# Patient Record
Sex: FEMALE | Race: WHITE | Employment: FULL TIME | URBAN - METROPOLITAN AREA
[De-identification: names, ages, dates, MRNs, and addresses within clinical notes are randomized per-mention and may not be internally consistent; named-entity substitution may affect disease eponyms.]

---

## 2017-08-10 ENCOUNTER — OFFICE VISIT (OUTPATIENT)
Dept: INTERNAL MEDICINE CLINIC | Age: 27
End: 2017-08-10

## 2017-08-10 VITALS
DIASTOLIC BLOOD PRESSURE: 62 MMHG | HEIGHT: 69 IN | BODY MASS INDEX: 19.55 KG/M2 | TEMPERATURE: 97.4 F | HEART RATE: 74 BPM | SYSTOLIC BLOOD PRESSURE: 99 MMHG | WEIGHT: 132 LBS | OXYGEN SATURATION: 100 %

## 2017-08-10 DIAGNOSIS — Z83.49 FAMILY HISTORY OF THYROID DISEASE: ICD-10-CM

## 2017-08-10 DIAGNOSIS — E04.1 THYROID NODULE: Primary | ICD-10-CM

## 2017-08-10 LAB
A-G RATIO,AGRAT: 1.7 RATIO (ref 1.1–2.6)
ALBUMIN SERPL-MCNC: 4.8 G/DL (ref 3.5–5)
ALP SERPL-CCNC: 67 U/L (ref 25–115)
ALT SERPL-CCNC: 8 U/L (ref 5–40)
ANION GAP SERPL CALC-SCNC: 26 MMOL/L
AST SERPL W P-5'-P-CCNC: 23 U/L (ref 10–37)
BILIRUB SERPL-MCNC: 0.3 MG/DL (ref 0.2–1.2)
BUN SERPL-MCNC: 11 MG/DL (ref 6–22)
CALCIUM SERPL-MCNC: 9.7 MG/DL (ref 8.4–10.5)
CHLORIDE SERPL-SCNC: 99 MMOL/L (ref 98–110)
CO2 SERPL-SCNC: 22 MMOL/L (ref 20–32)
CREAT SERPL-MCNC: 0.6 MG/DL (ref 0.5–1.2)
ERYTHROCYTE [DISTWIDTH] IN BLOOD BY AUTOMATED COUNT: 13 % (ref 10–16)
GFRAA, 66117: >60
GFRNA, 66118: >60
GLOBULIN,GLOB: 2.8 G/DL (ref 2–4)
GLUCOSE SERPL-MCNC: 89 MG/DL (ref 65–99)
HCT VFR BLD AUTO: 40 % (ref 35.1–46.5)
HGB BLD-MCNC: 12.5 G/DL (ref 11.7–15.5)
MCH RBC QN AUTO: 28 PG (ref 26–34)
MCHC RBC AUTO-ENTMCNC: 31 G/DL (ref 32–36)
MCV RBC AUTO: 91 FL (ref 80–95)
PLATELET # BLD AUTO: 230 K/UL (ref 140–440)
PMV BLD AUTO: 11.2 FL (ref 6–10.8)
POTASSIUM SERPL-SCNC: 4 MMOL/L (ref 3.5–5.5)
PROT SERPL-MCNC: 7.6 G/DL (ref 6.4–8.3)
RBC # BLD AUTO: 4.4 M/UL (ref 3.8–5.2)
SODIUM SERPL-SCNC: 147 MMOL/L (ref 133–145)
T4 FREE SERPL-MCNC: 1.1 NG/DL (ref 0.9–1.8)
TSH SERPL DL<=0.005 MIU/L-ACNC: 3.56 MCU/ML (ref 0.27–4.2)
WBC # BLD AUTO: 4.5 K/UL (ref 4–11)

## 2017-08-10 RX ORDER — ASCORBIC ACID 250 MG
1000 TABLET ORAL DAILY
COMMUNITY
End: 2022-09-22

## 2017-08-10 NOTE — PROGRESS NOTES
HISTORY OF PRESENT ILLNESS  Sandra Puente is a 24 y.o. female. HPI Comments: New patient presents today with c/o mass on thyroid, onset 2 weeks ago, unchanged since onset. She denies pain, immobility or firmness of nodule. When asked about family history she reports Hypothyroidism on mothers side (mom, MGM, aunt). States she had her thyroid tested 2-3yrs ago d/t weight gain- results were normal.   She denies acid reflux, dysphagia, sore throat, new onset fatigue, weight gain, constipation, memory problems, hair loss, new medication. She does report recent stress, acne flare up and change in diet to all vegan. Mass   The history is provided by the patient. This is a new problem. The current episode started more than 1 week ago. The problem occurs daily. The problem has not changed since onset. Pertinent negatives include no chest pain and no shortness of breath. Nothing aggravates the symptoms. Nothing relieves the symptoms. She has tried nothing for the symptoms. Review of Systems   Constitutional: Negative for chills, fever and malaise/fatigue. HENT: Negative for sore throat. Respiratory: Negative for shortness of breath. Cardiovascular: Negative for chest pain. Gastrointestinal: Negative for constipation, diarrhea and heartburn. Musculoskeletal: Negative for neck pain. Skin: Negative for rash. Endo/Heme/Allergies: Negative for environmental allergies. Psychiatric/Behavioral:        Stress       Physical Exam   Constitutional: She appears well-developed and well-nourished. No distress. HENT:   Head: Normocephalic and atraumatic. Right Ear: External ear normal.   Left Ear: External ear normal.   Nose: Nose normal.   Mouth/Throat: Oropharynx is clear and moist.   Scattered acne vulgaris on bilateral jawline   Neck: Normal range of motion. Carotid bruit is not present. No edema and no erythema present. Thyroid mass present. No thyromegaly present.        Cardiovascular: Normal rate and regular rhythm. Pulmonary/Chest: Effort normal and breath sounds normal.   Lymphadenopathy:     She has no cervical adenopathy. Visit Vitals    BP 99/62 (BP 1 Location: Right arm, BP Patient Position: Sitting)    Pulse 74    Temp 97.4 °F (36.3 °C) (Oral)    Ht 5' 9\" (1.753 m)    Wt 132 lb (59.9 kg)    SpO2 100%    BMI 19.49 kg/m2      ASSESSMENT and PLAN    ICD-10-CM ICD-9-CM    1. Thyroid nodule E04.1 241.0 TSH AND FREE T4      METABOLIC PANEL, COMPREHENSIVE      CBC W/O DIFF      US THYROID/PARATHYROID/SOFT TISS   2. Family history of thyroid disease Z83.49 V18.19 US THYROID/PARATHYROID/SOFT TISS     Reviewed plan with patient including diagnoses, treatment and follow up. Provided AVS with education on above diagnoses. No further questions/concerns at this time. Pt to follow up as scheduled or sooner if symptoms worsen/fail to improve.

## 2017-08-10 NOTE — MR AVS SNAPSHOT
Visit Information Date & Time Provider Department Dept. Phone Encounter #  
 8/10/2017  9:00 AM Renny BolandRASHIDA [facility] [phone] [id number] Follow-up Instructions Return if symptoms worsen or fail to improve. Upcoming Health Maintenance Date Due  
 HPV AGE 9Y-34Y (1 of 3 - Female 3 Dose Series) 1/21/2003 DTaP/Tdap/Td series (1 - Tdap) 1/21/2013 PAP AKA CERVICAL CYTOLOGY 1/21/2013 INFLUENZA AGE 9 TO ADULT 8/1/2017 Allergies as of 8/10/2017  Never Reviewed Severity Noted Reaction Type Reactions Sulfa (Sulfonamide Antibiotics)  08/10/2017    Hives, Rash Current Immunizations  Never Reviewed No immunizations on file. Not reviewed this visit You Were Diagnosed With   
  
 Codes Comments Thyroid nodule    -  Primary ICD-10-CM: E04.1 ICD-9-CM: 241.0 Family history of thyroid disease     ICD-10-CM: Z83.49 
ICD-9-CM: V18.19 Vitals BP Pulse Temp Height(growth percentile) Weight(growth percentile) SpO2  
 99/62 (BP 1 Location: Right arm, BP Patient Position: Sitting) 74 97.4 °F (36.3 °C) (Oral) 5' 9\" (1.753 m) 132 lb (59.9 kg) 100% BMI Smoking Status 19.49 kg/m2 Never Smoker BMI and BSA Data Body Mass Index Body Surface Area  
 19.49 kg/m 2 1.71 m 2 Preferred Pharmacy Pharmacy Name Phone [facility] [address] [phone] Your Updated Medication List  
  
   
This list is accurate as of: 8/10/17  9:34 AM.  Always use your most recent med list.  
  
  
  
  
 multivitamin, tx-iron-ca-min 9 mg iron-400 mcg Tab tablet Commonly known as:  THERA-M w/ IRON Take 1 Tab by mouth daily. VITAMIN C 250 mg tablet Generic drug:  ascorbic acid (vitamin C) Take  by mouth. Follow-up Instructions Return if symptoms worsen or fail to improve. To-Do List   
 08/10/2017 Lab:  CBC W/O DIFF   
  
 08/10/2017 Lab:  METABOLIC PANEL, COMPREHENSIVE   
  
 08/10/2017 Lab:  TSH AND FREE T4   
  
 08/11/2017 Imaging:  US THYROID/PARATHYROID/SOFT TISS Introducing [facility]! [facility] introduces [employer] patient portal. Now you can access parts of your medical record, email your doctor's office, and request medication refills online. 1. In your internet browser, go to [email or web address] 2. Click on the First Time User? Click Here link in the Sign In box. You will see the New Member Sign Up page. 3. Enter your [employer] Access Code exactly as it appears below. You will not need to use this code after youve completed the sign-up process. If you do not sign up before the expiration date, you must request a new code. · [employer] Access Code: [email or web address] Expires: 11/8/2017  9:34 AM 
 
4. Enter the last four digits of your Social Security Number (xxxx) and Date of Birth (mm/dd/yyyy) as indicated and click Submit. You will be taken to the next sign-up page. 5. Create a [employer] ID. This will be your [employer] login ID and cannot be changed, so think of one that is secure and easy to remember. 6. Create a [employer] password. You can change your password at any time. 7. Enter your Password Reset Question and Answer. This can be used at a later time if you forget your password. 8. Enter your e-mail address. You will receive e-mail notification when new information is available in [address] 9. Click Sign Up. You can now view and download portions of your medical record. 10. Click the Download Summary menu link to download a portable copy of your medical information. If you have questions, please visit the Frequently Asked Questions section of the [employer] website. Remember, [employer] is NOT to be used for urgent needs. For medical emergencies, dial 911. Now available from your iPhone and Android! Please provide this summary of care documentation to your next provider. Your primary care clinician is listed as Eloisa Solano. If you have any questions after today's visit, please call [phone].

## 2017-08-10 NOTE — PROGRESS NOTES
ROOM # 9     Mahogany Abbasi presents today for lump in throat x 2 week     Mahogany Abbasi preferred language for health care discussion is english/other. Is someone accompanying this pt? no    Is the patient using any DME equipment during OV? no    Depression Screening:  PHQ over the last two weeks 8/10/2017   Little interest or pleasure in doing things Not at all   Feeling down, depressed or hopeless Not at all   Total Score PHQ 2 0       Learning Assessment:  Learning Assessment 8/10/2017   PRIMARY LEARNER Patient   HIGHEST LEVEL OF EDUCATION - PRIMARY LEARNER  4 YEARS OF COLLEGE   BARRIERS PRIMARY LEARNER NONE   CO-LEARNER CAREGIVER No   PRIMARY LANGUAGE ENGLISH   LEARNER PREFERENCE PRIMARY READING   ANSWERED BY Patient    RELATIONSHIP SELF       Abuse Screening:  Abuse Screening Questionnaire 8/10/2017   Do you ever feel afraid of your partner? N   Are you in a relationship with someone who physically or mentally threatens you? N   Is it safe for you to go home? Y       Fall Risk  n/i    Health Maintenance reviewed and discussed per provider. Yes    Mahogany Abbasi is due for   INFLUENZA AGE 9 TO ADULT 8/1/2017     . Please order/place referral if appropriate. Advance Directive:  1. Do you have an advance directive in place? Patient Reply: no    2. If not, would you like material regarding how to put one in place? Patient Reply: no    Coordination of Care:  1. Have you been to the ER, urgent care clinic since your last visit? Hospitalized since your last visit? no    2. Have you seen or consulted any other health care providers outside of the [address] [place] [address] since your last visit? Include any pap smears or colon screening.  no

## 2017-08-11 NOTE — PROGRESS NOTES
Please let patient know:  1. Labs came back normal  2.  We will still plan for ultrasound of thyroid

## 2017-08-12 ENCOUNTER — TELEPHONE (OUTPATIENT)
Dept: INTERNAL MEDICINE CLINIC | Age: 27
End: 2017-08-12

## 2017-08-12 NOTE — TELEPHONE ENCOUNTER
----- Message from Jacy Rodriguez NP sent at 8/11/2017  7:28 AM EDT -----  Please let patient know:  1. Labs came back normal  2.  We will still plan for ultrasound of thyroid

## 2017-08-16 ENCOUNTER — HOSPITAL ENCOUNTER (OUTPATIENT)
Dept: ULTRASOUND IMAGING | Age: 27
Discharge: HOME | End: 2017-08-16
Attending: NURSE PRACTITIONER
Payer: COMMERCIAL

## 2017-08-16 DIAGNOSIS — Z83.49 FAMILY HISTORY OF THYROID DISEASE: ICD-10-CM

## 2017-08-16 DIAGNOSIS — E04.1 THYROID NODULE: ICD-10-CM

## 2017-08-16 PROCEDURE — 76536 US EXAM OF HEAD AND NECK: CPT

## 2017-08-16 NOTE — LETTER
8/18/2017 12:54 PM 
 
Ms. Mannie Newell [address] [place] [address] [phone] Dear Mannie Newell: 
 
Please find your most recent results below. Resulted Orders US THYROID/PARATHYROID/SOFT TISS Narrative ULTRASOUND THYROID History: Palpable nodule or enlarged thyroid Technique: Contact transcervical thyroid ultrasound performed. No prior exams. Findings: 
 
Right: 
[ Right thyroid lobe is of normal size and parenchymal echogenicity, measuring 5.0 x 1.5 x 1.6 cm. No evident discrete lobar nodule ] Isthmus appears unremarkable. Left: 
[ Left thyroid lobe is of normal size and parenchymal echogenicity, measuring 4.5 x 1.4 x 1.5 cm. There is a diminutive 0.2 cm hypoechoic nodule or cyst. ] Miscellaneous: 
[ Just superolateral to the right thyroid lobe, there is an elliptical 1.3 x 1.0 
x 0.4 cm hypoechoic homogeneous nodule which has mild internal vasculature which 
is somewhat superficial to the strap musculature, off midline. ] Impression Impression: 1. [Sonographically unremarkable thyroid gland. 2. Elliptical hypodense nodule superficial right supra thyroid neck nodule. Features are nonspecific but this may represent a mildly enlarged lymph node]. RECOMMENDATIONS: 
Node is superficial (below the surface) and not on the thyroid gland, likely representing a swollen lymph node. We will monitor for the next month and if the node does not decrease in size or if it increases, then we will proceed with a biopsy. Please call me if you have any questions: [phone] Sincerely, Reina Swain NP

## 2017-08-17 NOTE — PROGRESS NOTES
Please let patient know results thyroid US:  1. Nodule is likely a lymph node (can be r/t acute viral infection)   2. We will monitor nodule for now  3.  If changes in size, firmness, or presence of pain, let us know

## 2017-08-18 ENCOUNTER — TELEPHONE (OUTPATIENT)
Dept: INTERNAL MEDICINE CLINIC | Age: 27
End: 2017-08-18

## 2017-08-18 NOTE — TELEPHONE ENCOUNTER
----- Message from Kelvin Carey NP sent at 8/17/2017 11:02 AM EDT -----  Please let patient know results thyroid US:  1. Nodule is likely a lymph node (can be r/t acute viral infection)   2. We will monitor nodule for now  3.  If changes in size, firmness, or presence of pain, let us know

## 2017-08-18 NOTE — TELEPHONE ENCOUNTER
2 pt. Identifiers confirmed. Pt. Notified of below. Explained to pt. That this could have been something simple like a URI/cold that caused this. Pt. staets she would like to s/w RASHIDA Juares as she doesn not remember being sick. Pt. Transferred to s/w RASHIDA Juaers. No other questions/concerns at this time.

## 2017-08-18 NOTE — TELEPHONE ENCOUNTER
----- Message from Faustina Garcia NP sent at 8/17/2017 11:02 AM EDT -----  Please let patient know results thyroid US:  1. Nodule is likely a lymph node (can be r/t acute viral infection)   2. We will monitor nodule for now  3.  If changes in size, firmness, or presence of pain, let us know

## 2017-08-18 NOTE — PROGRESS NOTES
Call made to pt, pt was advised that:    1. Nodule is likely a lymph node (can be r/t acute viral infection)   2. We will monitor nodule for now   3. If changes in size, firmness, or presence of pain, let us know     Pt states that she doesn't understand how this could be r/t acute viral infection because she wasn't feeling sick or experiencing any type of illness. I advised pt that I would forward her concerns to the provider. Pt verbalized understanding.

## 2017-08-22 ENCOUNTER — OFFICE VISIT (OUTPATIENT)
Dept: INTERNAL MEDICINE CLINIC | Age: 27
End: 2017-08-22

## 2017-08-22 VITALS
RESPIRATION RATE: 18 BRPM | TEMPERATURE: 97.4 F | WEIGHT: 135.6 LBS | DIASTOLIC BLOOD PRESSURE: 66 MMHG | HEIGHT: 69 IN | OXYGEN SATURATION: 99 % | HEART RATE: 103 BPM | BODY MASS INDEX: 20.08 KG/M2 | SYSTOLIC BLOOD PRESSURE: 101 MMHG

## 2017-08-22 DIAGNOSIS — R59.0 ENLARGED LYMPH NODE IN NECK: ICD-10-CM

## 2017-08-22 DIAGNOSIS — L73.9 FOLLICULITIS: Primary | ICD-10-CM

## 2017-08-22 NOTE — MR AVS SNAPSHOT
Visit Information Date & Time Provider Department Dept. Phone Encounter #  
 8/22/2017  3:00 PM Joseph Thayer NP [facility] [phone] [id number] Follow-up Instructions Return if symptoms worsen or fail to improve. Upcoming Health Maintenance Date Due  
 HPV AGE 9Y-34Y (1 of 3 - Female 3 Dose Series) 1/21/2003 PAP AKA CERVICAL CYTOLOGY 1/21/2013 DTaP/Tdap/Td series (1 - Tdap) 9/15/2017* INFLUENZA AGE 9 TO ADULT 9/15/2017* *Topic was postponed. The date shown is not the original due date. Allergies as of 8/22/2017  Review Complete On: 8/22/2017 By: Kathi Rosales LPN Severity Noted Reaction Type Reactions Sulfa (Sulfonamide Antibiotics)  08/10/2017    Hives, Rash Current Immunizations  Never Reviewed No immunizations on file. Not reviewed this visit You Were Diagnosed With   
  
 Codes Comments Folliculitis    -  Primary ICD-10-CM: L73.9 ICD-9-CM: 704.8 Enlarged lymph node in neck     ICD-10-CM: R59.0 ICD-9-CM: 845. 6 Vitals BP Pulse Temp Resp Height(growth percentile) Weight(growth percentile) 101/66 (BP 1 Location: Left arm, BP Patient Position: Sitting) (!) 103 97.4 °F (36.3 °C) (Oral) 18 5' 9\" (1.753 m) 135 lb 9.6 oz (61.5 kg) LMP SpO2 BMI OB Status Smoking Status 08/05/2017 99% 20.02 kg/m2 Having regular periods Never Smoker Vitals History BMI and BSA Data Body Mass Index Body Surface Area 20.02 kg/m 2 1.73 m 2 Preferred Pharmacy Pharmacy Name Phone [facility] [address] [facility], [address] [place] [address] [phone] Your Updated Medication List  
  
   
This list is accurate as of: 8/22/17  3:49 PM.  Always use your most recent med list.  
  
  
  
  
 multivitamin, tx-iron-ca-min 9 mg iron-400 mcg Tab tablet Commonly known as:  THERA-M w/ IRON Take 1 Tab by mouth daily. VITAMIN C 250 mg tablet Generic drug:  ascorbic acid (vitamin C) Take 1,000 mg by mouth daily. Follow-up Instructions Return if symptoms worsen or fail to improve. Patient Instructions Folliculitis: Care Instructions Your Care Instructions Folliculitis (say \"epo-VUY-qtn-LY-tus\") is an infection of the pouches (follicles) in the skin where hair grows. It can occur on any part of the body, but it is most common on the scalp, face, armpits, and groin. Bacteria, such as those found in a hot tub, can cause folliculitis. Folliculitis begins as a red, tender area near a strand of hair. The skin can itch or burn and may drain pus or blood. Sometimes folliculitis can lead to more serious skin infections. Your doctor usually can treat mild folliculitis with an antibiotic cream or ointment. If you have folliculitis on your scalp, you may use a shampoo that kills bacteria. Antibiotics you take as pills can treat infections deeper in the skin. For stubborn cases of folliculitis, laser treatment may be an option. Laser treatment uses strong beams of light to destroy the hair follicle. But hair will no longer grow in the treated area. Follow-up care is a key part of your treatment and safety. Be sure to make and go to all appointments, and call your doctor if you are having problems. It's also a good idea to know your test results and keep a list of the medicines you take. How can you care for yourself at home? · Take your medicine exactly as prescribed. If your doctor prescribed antibiotics, take them as directed. Do not stop taking them just because you feel better. You need to take the full course of antibiotics. · Use a soap that kills bacteria to wash the infected area. If your scalp or beard is infected, use a shampoo with selenium or propylene glycol. Be careful. Do not scrub too long or too hard. · Mix 1 1/3 cup warm water and 1 tablespoon vinegar.  Soak a cloth in the mixture, and place it over the infected skin until it cools off (usually 5 to 10 minutes). You can do this 3 to 6 times a day. · Do not share your razor, towel, or washcloth. That can spread folliculitis. · Use a new blade in your razor each time you shave to keep from re-infecting your skin. · If you tend to get folliculitis, avoid using hot tubs. They can contain bacteria that cause folliculitis. When should you call for help? Call your doctor now or seek immediate medical care if: 
· You have symptoms of infection, such as: 
¨ Increased pain, swelling, warmth, or redness. ¨ Red streaks leading from the area. ¨ Pus draining from the area. ¨ A fever. Watch closely for changes in your health, and be sure to contact your doctor if: 
· You do not get better as expected. Where can you learn more? Go to http://mauricioWebcentrixsonya.info/. Enter M257 in the search box to learn more about \"Folliculitis: Care Instructions. \" Current as of: October 13, 2016 Content Version: 11.3 © 2381-5230 [employer]. Care instructions adapted under license by [employer] (which disclaims liability or warranty for this information). If you have questions about a medical condition or this instruction, always ask your healthcare professional. [address] any warranty or liability for your use of this information. Thyroid-Stimulating Hormone (TSH) Test: About This Test 
What is it? A thyroid-stimulating hormone (TSH) test is one of several blood tests used to check for thyroid gland problems. TSH causes the thyroid gland to make other important hormones that help control your body's metabolism. Why is this test done? This test is done to: · Find out whether the thyroid gland is working properly. · Find out if a problem with the thyroid is causing symptoms such as tiredness, weight gain, or weight loss. · Keep track of how well thyroid treatment is working. How can you prepare for the test? 
· In general, you dont need to prepare before having this test. Your doctor may give you some specific instructions. What happens during the test? 
· A health professional takes a sample of your blood. What else should you know about the test? 
· This test may be done at the same time as tests to measure other thyroid hormones. · Your results will include an explanation of what a \"normal\" result is. This is called a \"reference range. \" It is just a guide. Your doctor will evaluate your results based on your health and other factors. This means that a value that falls outside the normal values listed may still be normal for you. How long does the test take? · The test will take a few minutes. What happens after the test? 
· You will probably be able to go home right away. · You can go back to your usual activities right away. Follow-up care is a key part of your treatment and safety. Be sure to make and go to all appointments, and call your doctor if you are having problems. It's also a good idea to keep a list of the medicines you take. Ask your doctor when you can expect to have your test results. Where can you learn more? Go to http://mauricio-sonya.info/. Enter A777 in the search box to learn more about \"Thyroid-Stimulating Hormone (TSH) Test: About This Test.\" Current as of: July 28, 2016 Content Version: 11.3 © 2142-0065 [employer]. Care instructions adapted under license by [employer] (which disclaims liability or warranty for this information). If you have questions about a medical condition or this instruction, always ask your healthcare professional. [address] any warranty or liability for your use of this information. Introducing [facility]! Dear Charli Davis: Thank you for requesting a [employer] account.   Our records indicate that you already have an active [employer] account. You can access your account anytime at [email or web address] Did you know that you can access your hospital and ER discharge instructions at any time in [employer]? You can also review all of your test results from your hospital stay or ER visit. Additional Information If you have questions, please visit the Frequently Asked Questions section of the [employer] website at [email or web address] Remember, [employer] is NOT to be used for urgent needs. For medical emergencies, dial 911. Now available from your iPhone and Android! Please provide this summary of care documentation to your next provider. Your primary care clinician is listed as August Mortimer. If you have any questions after today's visit, please call [phone].

## 2017-08-22 NOTE — PROGRESS NOTES
Sandra Puente presents today for   Chief Complaint   Patient presents with   Noreen Davion Mass     swollen glands in neck. afebrile       Sandra Puente preferred language for health care discussion is english/other. Is someone accompanying this pt? no    Is the patient using any DME equipment during OV? no    Depression Screening:  PHQ over the last two weeks 8/22/2017 8/10/2017   Little interest or pleasure in doing things Not at all Not at all   Feeling down, depressed or hopeless Not at all Not at all   Total Score PHQ 2 0 0       Learning Assessment:  Learning Assessment 8/10/2017   PRIMARY LEARNER Patient   HIGHEST LEVEL OF EDUCATION - PRIMARY LEARNER  4 YEARS OF COLLEGE   BARRIERS PRIMARY LEARNER NONE   CO-LEARNER CAREGIVER No   PRIMARY LANGUAGE ENGLISH   LEARNER PREFERENCE PRIMARY READING   ANSWERED BY Patient    RELATIONSHIP SELF       Abuse Screening:  Abuse Screening Questionnaire 8/10/2017   Do you ever feel afraid of your partner? N   Are you in a relationship with someone who physically or mentally threatens you? N   Is it safe for you to go home? Y       Fall Risk  No flowsheet data found. Health Maintenance reviewed and discussed per provider. Yes    Sandra Puente is due for pap smear. Please order/place referral if appropriate. Advance Directive:  1. Do you have an advance directive in place? Patient Reply: no    2. If not, would you like material regarding how to put one in place? Patient Reply: no    Coordination of Care:  1. Have you been to the ER, urgent care clinic since your last visit? Hospitalized since your last visit? no    2. Have you seen or consulted any other health care providers outside of the [address] [place] [address] since your last visit? Include any pap smears or colon screening.  no

## 2017-08-22 NOTE — PATIENT INSTRUCTIONS
Folliculitis: Care Instructions  Your Care Instructions    Folliculitis (say \"kkz-EZH-krb-LY-tus\") is an infection of the pouches (follicles) in the skin where hair grows. It can occur on any part of the body, but it is most common on the scalp, face, armpits, and groin. Bacteria, such as those found in a hot tub, can cause folliculitis. Folliculitis begins as a red, tender area near a strand of hair. The skin can itch or burn and may drain pus or blood. Sometimes folliculitis can lead to more serious skin infections. Your doctor usually can treat mild folliculitis with an antibiotic cream or ointment. If you have folliculitis on your scalp, you may use a shampoo that kills bacteria. Antibiotics you take as pills can treat infections deeper in the skin. For stubborn cases of folliculitis, laser treatment may be an option. Laser treatment uses strong beams of light to destroy the hair follicle. But hair will no longer grow in the treated area. Follow-up care is a key part of your treatment and safety. Be sure to make and go to all appointments, and call your doctor if you are having problems. It's also a good idea to know your test results and keep a list of the medicines you take. How can you care for yourself at home? · Take your medicine exactly as prescribed. If your doctor prescribed antibiotics, take them as directed. Do not stop taking them just because you feel better. You need to take the full course of antibiotics. · Use a soap that kills bacteria to wash the infected area. If your scalp or beard is infected, use a shampoo with selenium or propylene glycol. Be careful. Do not scrub too long or too hard. · Mix 1 1/3 cup warm water and 1 tablespoon vinegar. Soak a cloth in the mixture, and place it over the infected skin until it cools off (usually 5 to 10 minutes). You can do this 3 to 6 times a day. · Do not share your razor, towel, or washcloth. That can spread folliculitis.   · Use a new blade in your razor each time you shave to keep from re-infecting your skin. · If you tend to get folliculitis, avoid using hot tubs. They can contain bacteria that cause folliculitis. When should you call for help? Call your doctor now or seek immediate medical care if:  · You have symptoms of infection, such as:  ¨ Increased pain, swelling, warmth, or redness. ¨ Red streaks leading from the area. ¨ Pus draining from the area. ¨ A fever. Watch closely for changes in your health, and be sure to contact your doctor if:  · You do not get better as expected. Where can you learn more? Go to http://mauricio-sonya.info/. Enter M257 in the search box to learn more about \"Folliculitis: Care Instructions. \"  Current as of: October 13, 2016  Content Version: 11.3  © 7539-7833 [employer]. Care instructions adapted under license by [employer] ([employer]) (which disclaims liability or warranty for this information). If you have questions about a medical condition or this instruction, always ask your healthcare professional. [address] any warranty or liability for your use of this information. Thyroid-Stimulating Hormone (TSH) Test: About This Test  What is it? A thyroid-stimulating hormone (TSH) test is one of several blood tests used to check for thyroid gland problems. TSH causes the thyroid gland to make other important hormones that help control your body's metabolism. Why is this test done? This test is done to:  · Find out whether the thyroid gland is working properly. · Find out if a problem with the thyroid is causing symptoms such as tiredness, weight gain, or weight loss. · Keep track of how well thyroid treatment is working. How can you prepare for the test?  · In general, you dont need to prepare before having this test. Your doctor may give you some specific instructions.   What happens during the test?  · A health professional takes a sample of your blood. What else should you know about the test?  · This test may be done at the same time as tests to measure other thyroid hormones. · Your results will include an explanation of what a \"normal\" result is. This is called a \"reference range. \" It is just a guide. Your doctor will evaluate your results based on your health and other factors. This means that a value that falls outside the normal values listed may still be normal for you. How long does the test take? · The test will take a few minutes. What happens after the test?  · You will probably be able to go home right away. · You can go back to your usual activities right away. Follow-up care is a key part of your treatment and safety. Be sure to make and go to all appointments, and call your doctor if you are having problems. It's also a good idea to keep a list of the medicines you take. Ask your doctor when you can expect to have your test results. Where can you learn more? Go to http://mauricio-sonya.info/. Enter P597 in the search box to learn more about \"Thyroid-Stimulating Hormone (TSH) Test: About This Test.\"  Current as of: July 28, 2016  Content Version: 11.3  © 5179-8291 [employer]. Care instructions adapted under license by [employer] (which disclaims liability or warranty for this information). If you have questions about a medical condition or this instruction, always ask your healthcare professional. [address] any warranty or liability for your use of this information.

## 2017-08-22 NOTE — PROGRESS NOTES
HISTORY OF PRESENT ILLNESS  Nathan Baca is a 24 y.o. female. HPI Comments: Patient presents today for f/u thyroid nodule and for new lump on back of neck. Reports thyroid nodule has decreased in size since Saturday and denies new pain, firmness or warmth at site. Lump on back of neck noticed Saturday night, reporting tenderness to palpation, firmness. Denies warmth, drainage at site. She has not tried any treatment. Denies aggravating or relieving symptoms. She states she is very anxious over swelling. Mass   The history is provided by the patient. This is a new problem. The current episode started more than 1 week ago. The problem occurs daily. The problem has been gradually improving. Pertinent negatives include no chest pain. Nothing aggravates the symptoms. Nothing relieves the symptoms. She has tried nothing for the symptoms. Skin Problem   The history is provided by the patient. This is a new problem. The current episode started more than 2 days ago. The problem occurs daily. The problem has not changed since onset. Pertinent negatives include no chest pain. Associated symptoms comments: Tenderness, redness, firm  . Nothing aggravates the symptoms. Nothing relieves the symptoms. She has tried nothing for the symptoms. Review of Systems   Constitutional: Negative for chills and fever. HENT: Negative for congestion and sore throat. Respiratory: Negative for cough. Cardiovascular: Negative for chest pain. Skin: Negative for itching and rash. Physical Exam   Constitutional: She appears well-developed and well-nourished. HENT:   Head: Normocephalic and atraumatic. Neck: Normal range of motion. Neck supple. Carotid bruit is not present. No thyroid mass and no thyromegaly present. Cardiovascular: Normal rate, regular rhythm and normal heart sounds. Pulmonary/Chest: Effort normal and breath sounds normal. No respiratory distress. Skin: Skin is warm and dry. Psychiatric: Her mood appears anxious. Visit Vitals    /66 (BP 1 Location: Left arm, BP Patient Position: Sitting)    Pulse (!) 103    Temp 97.4 °F (36.3 °C) (Oral)    Resp 18    Ht 5' 9\" (1.753 m)    Wt 135 lb 9.6 oz (61.5 kg)    SpO2 99%    BMI 20.02 kg/m2      ASSESSMENT and PLAN    ICD-10-CM ICD-9-CM    1. Folliculitis M82.1 194.3    2. Enlarged lymph node in neck R59.0 785. 6      Reviewed plan with patient including diagnoses, treatment and follow up. Reviewed most recent lab work including Thyroid levels and meaning of results. Provided AVS with education on above. No further questions/concerns at this time. Pt to follow up as scheduled or sooner if symptoms worsen/fail to improve.

## 2017-08-25 ENCOUNTER — OFFICE VISIT (OUTPATIENT)
Dept: INTERNAL MEDICINE CLINIC | Age: 27
End: 2017-08-25

## 2017-08-25 VITALS
BODY MASS INDEX: 19.85 KG/M2 | SYSTOLIC BLOOD PRESSURE: 107 MMHG | HEIGHT: 69 IN | HEART RATE: 92 BPM | OXYGEN SATURATION: 98 % | RESPIRATION RATE: 18 BRPM | TEMPERATURE: 97.8 F | DIASTOLIC BLOOD PRESSURE: 63 MMHG | WEIGHT: 134 LBS

## 2017-08-25 DIAGNOSIS — R22.1 LUMP IN NECK: Primary | ICD-10-CM

## 2017-08-25 DIAGNOSIS — R59.9 SWOLLEN LYMPH NODES: ICD-10-CM

## 2017-08-25 RX ORDER — CLINDAMYCIN PHOSPHATE 10 MG/G
GEL TOPICAL
Refills: 5 | COMMUNITY
Start: 2017-08-24 | End: 2022-09-22

## 2017-08-25 RX ORDER — ADAPALENE 3 MG/G
GEL TOPICAL
Refills: 5 | COMMUNITY
Start: 2017-08-24

## 2017-08-25 RX ORDER — DOXYCYCLINE 100 MG/1
CAPSULE ORAL
Refills: 3 | COMMUNITY
Start: 2017-08-24 | End: 2022-09-22

## 2017-08-25 NOTE — PATIENT INSTRUCTIONS
Swollen Lymph Nodes: Care Instructions  Your Care Instructions  Lymph nodes are small, bean-shaped glands throughout the body. They help your body fight germs and infections. Lymph nodes often swell when there is a problem such as an injury, infection, or tumor. · The nodes in your neck, under your chin, or behind your ears may swell when you have a cold or sore throat. · An injury or infection in a leg or foot can make the nodes in your groin swell. · Sometimes medicine can make lymph nodes swell, but this is rare. Treatment depends on what caused your nodes to swell. Usually the nodes return to normal size without a problem. Follow-up care is a key part of your treatment and safety. Be sure to make and go to all appointments, and call your doctor if you are having problems. Its also a good idea to know your test results and keep a list of the medicines you take. How can you care for yourself at home? · Take your medicines exactly as prescribed. Call your doctor if you think you are having a problem with your medicine. · Avoid irritation. ¨ Do not squeeze or pick at the lump. ¨ Do not stick a needle in it. · Prevent infection. Do not squeeze, drain, or puncture a painful lump. Doing this can irritate or inflame the lump, push any existing infection deeper into the skin, or cause severe bleeding. · Get extra rest. Slow down just a little from your usual routine. · Drink plenty of fluids, enough so that your urine is light yellow or clear like water. If you have kidney, heart, or liver disease and have to limit fluids, talk with your doctor before you increase the amount of fluids you drink. · Take an over-the-counter pain medicine, such as acetaminophen (Tylenol), ibuprofen (Advil, Motrin), or naproxen (Aleve). Read and follow all instructions on the label. · Do not take two or more pain medicines at the same time unless the doctor told you to.  Many pain medicines have acetaminophen, which is Tylenol. Too much acetaminophen (Tylenol) can be harmful. When should you call for help? Watch closely for changes in your health, and be sure to contact your doctor if:  · Your lymph nodes do not get smaller, or they get bigger. · The area becomes red and feels tender. · The nodes feel hard and do not move when you push on them. · You have a fever of 100° F.  · You have night sweats. · You lose weight without trying. Where can you learn more? Go to http://mauricio-sonya.info/. Enter M221 in the search box to learn more about \"Swollen Lymph Nodes: Care Instructions. \"  Current as of: March 3, 2017  Content Version: 11.3  © 7559-0963 [employer]. Care instructions adapted under license by [employer] (which disclaims liability or warranty for this information). If you have questions about a medical condition or this instruction, always ask your healthcare professional. [place] [address] any warranty or liability for your use of this information.

## 2017-08-25 NOTE — MR AVS SNAPSHOT
Visit Information Date & Time Provider Department Dept. Phone Encounter #  
 8/25/2017  2:00 PM Cindy He NP [address] [phone] [id number] Follow-up Instructions Return if symptoms worsen or fail to improve. Upcoming Health Maintenance Date Due  
 HPV AGE 9Y-34Y (1 of 3 - Female 3 Dose Series) 1/21/2003 PAP AKA CERVICAL CYTOLOGY 1/21/2013 DTaP/Tdap/Td series (1 - Tdap) 9/15/2017* INFLUENZA AGE 9 TO ADULT 9/15/2017* *Topic was postponed. The date shown is not the original due date. Allergies as of 8/25/2017  Review Complete On: 8/25/2017 By: Samy Knox LPN Severity Noted Reaction Type Reactions Sulfa (Sulfonamide Antibiotics)  08/10/2017    Hives, Rash Current Immunizations  Never Reviewed No immunizations on file. Not reviewed this visit You Were Diagnosed With   
  
 Codes Comments Lump in neck    -  Primary ICD-10-CM: R22.1 ICD-9-CM: 184. 2 Swollen lymph nodes     ICD-10-CM: R59.9 ICD-9-CM: 672. 6 Vitals BP Pulse Temp Resp Height(growth percentile) Weight(growth percentile) 107/63 (BP 1 Location: Right arm, BP Patient Position: Sitting) 92 97.8 °F (36.6 °C) (Oral) 18 5' 9\" (1.753 m) 134 lb (60.8 kg) LMP SpO2 BMI OB Status Smoking Status 08/05/2017 98% 19.79 kg/m2 Having regular periods Never Smoker Vitals History BMI and BSA Data Body Mass Index Body Surface Area 19.79 kg/m 2 1.72 m 2 Preferred Pharmacy Pharmacy Name Phone [facility] [address] [facility], [address] [place] [address] [phone] Your Updated Medication List  
  
   
This list is accurate as of: 8/25/17  2:30 PM.  Always use your most recent med list.  
  
  
  
  
 multivitamin, tx-iron-ca-min 9 mg iron-400 mcg Tab tablet Commonly known as:  THERA-M w/ IRON Take 1 Tab by mouth daily. VITAMIN C 250 mg tablet Generic drug:  ascorbic acid (vitamin C) Take 1,000 mg by mouth daily. Follow-up Instructions Return if symptoms worsen or fail to improve. To-Do List   
 08/28/2017 Imaging:  CT NECK SOFT TISSUE W CONT Referral Information Referral ID Referred By Referred To  
  
 [id number] Silvina Tello Not Available Visits Status Start Date End Date 1 New Request 8/25/17 8/25/18 If your referral has a status of pending review or denied, additional information will be sent to support the outcome of this decision. Patient Instructions Swollen Lymph Nodes: Care Instructions Your Care Instructions Lymph nodes are small, bean-shaped glands throughout the body. They help your body fight germs and infections. Lymph nodes often swell when there is a problem such as an injury, infection, or tumor. · The nodes in your neck, under your chin, or behind your ears may swell when you have a cold or sore throat. · An injury or infection in a leg or foot can make the nodes in your groin swell. · Sometimes medicine can make lymph nodes swell, but this is rare. Treatment depends on what caused your nodes to swell. Usually the nodes return to normal size without a problem. Follow-up care is a key part of your treatment and safety. Be sure to make and go to all appointments, and call your doctor if you are having problems. Its also a good idea to know your test results and keep a list of the medicines you take. How can you care for yourself at home? · Take your medicines exactly as prescribed. Call your doctor if you think you are having a problem with your medicine. · Avoid irritation. ¨ Do not squeeze or pick at the lump. ¨ Do not stick a needle in it. · Prevent infection. Do not squeeze, drain, or puncture a painful lump. Doing this can irritate or inflame the lump, push any existing infection deeper into the skin, or cause severe bleeding. · Get extra rest. Slow down just a little from your usual routine. · Drink plenty of fluids, enough so that your urine is light yellow or clear like water. If you have kidney, heart, or liver disease and have to limit fluids, talk with your doctor before you increase the amount of fluids you drink. · Take an over-the-counter pain medicine, such as acetaminophen (Tylenol), ibuprofen (Advil, Motrin), or naproxen (Aleve). Read and follow all instructions on the label. · Do not take two or more pain medicines at the same time unless the doctor told you to. Many pain medicines have acetaminophen, which is Tylenol. Too much acetaminophen (Tylenol) can be harmful. When should you call for help? Watch closely for changes in your health, and be sure to contact your doctor if: 
· Your lymph nodes do not get smaller, or they get bigger. · The area becomes red and feels tender. · The nodes feel hard and do not move when you push on them. · You have a fever of 100° F. 
· You have night sweats. · You lose weight without trying. Where can you learn more? Go to http://mauricio-sonya.info/. Enter D165 in the search box to learn more about \"Swollen Lymph Nodes: Care Instructions. \" Current as of: March 3, 2017 Content Version: 11.3 © 0536-9373 [employer]. Care instructions adapted under license by [employer] (which disclaims liability or warranty for this information). If you have questions about a medical condition or this instruction, always ask your healthcare professional. [place] [address] any warranty or liability for your use of this information. Introducing [facility]! Dear Parish Douglas: Thank you for requesting a Entrec account. Our records indicate that you already have an active Entrec account. You can access your account anytime at [email or web address] Did you know that you can access your hospital and ER discharge instructions at any time in Black[facility]? You can also review all of your test results from your hospital stay or ER visit. Additional Information If you have questions, please visit the Frequently Asked Questions section of the Blackbay website at [email or web address] Remember, Blackbay is NOT to be used for urgent needs. For medical emergencies, dial 911. Now available from your iPhone and Android! Please provide this summary of care documentation to your next provider. Your primary care clinician is listed as Libra Olguin. If you have any questions after today's visit, please call [phone].

## 2017-08-25 NOTE — PROGRESS NOTES
HISTORY OF PRESENT ILLNESS  Marsha Stevens is a 24 y.o. female. HPI Comments: Patient presents today with c/o multiple lumps in neck, noticed Wednesday. She was recently here for lump found on R side of thyroid for which she had a thyroid US that was negative. She reports R lump is still there but smaller. New lumps described as soft, mobile, and non-tender. Denies recent fever, chills, acute injury/trauma. She does admit she is very anxious and stressed about lumps. Mass   The history is provided by the patient. This is a recurrent problem. The current episode started more than 2 days ago. The problem occurs daily. The problem has not changed since onset. Pertinent negatives include no chest pain and no shortness of breath. Nothing aggravates the symptoms. Nothing relieves the symptoms. She has tried nothing for the symptoms. Review of Systems   Constitutional: Negative for chills and fever. HENT:        Lumps in neck       Respiratory: Negative for shortness of breath. Cardiovascular: Negative for chest pain. Psychiatric/Behavioral: The patient is nervous/anxious. Physical Exam   Constitutional: She appears well-developed and well-nourished. Neck:       Cardiovascular: Normal rate, regular rhythm and normal heart sounds. Pulmonary/Chest: Effort normal and breath sounds normal. No respiratory distress. Lymphadenopathy:        Head (left side): Submandibular adenopathy present. She has cervical adenopathy. Right cervical: Superficial cervical adenopathy present. Visit Vitals    /63 (BP 1 Location: Right arm, BP Patient Position: Sitting)    Pulse 92    Temp 97.8 °F (36.6 °C) (Oral)    Resp 18    Ht 5' 9\" (1.753 m)    Wt 134 lb (60.8 kg)    SpO2 98%    BMI 19.79 kg/m2      ASSESSMENT and PLAN    ICD-10-CM ICD-9-CM    1. Lump in neck R22.1 784.2 CT NECK SOFT TISSUE W CONT   2.  Swollen lymph nodes R59.9 785.6 CT NECK SOFT TISSUE W CONT     Reviewed plan with patient including diagnoses, treatment and follow up. Provided AVS with education on above diagnoses. No further questions/concerns at this time. Pt to follow up as scheduled or sooner if symptoms worsen/fail to improve.

## 2017-08-25 NOTE — PROGRESS NOTES
ROOM # 3    Demian Edge presents today for   Chief Complaint   Patient presents with    Mass     pt presents to clinic stating she has a nodule on the lft side of her neck, pt states she was seen here a couple of days ago and now has a new nodule, pt denies any pain in the area       Demian Edge preferred language for health care discussion is english/other. Is someone accompanying this pt? no    Is the patient using any DME equipment during OV? no    Depression Screening:  PHQ over the last two weeks 8/25/2017 8/22/2017 8/10/2017   Little interest or pleasure in doing things Not at all Not at all Not at all   Feeling down, depressed or hopeless Not at all Not at all Not at all   Total Score PHQ 2 0 0 0       Learning Assessment:  Learning Assessment 8/10/2017   PRIMARY LEARNER Patient   HIGHEST LEVEL OF EDUCATION - PRIMARY LEARNER  4 YEARS Mirna PRIMARY LEARNER NONE   CO-LEARNER CAREGIVER No   PRIMARY LANGUAGE ENGLISH   LEARNER PREFERENCE PRIMARY READING   ANSWERED BY Patient    RELATIONSHIP SELF       Abuse Screening:  Abuse Screening Questionnaire 8/10/2017   Do you ever feel afraid of your partner? N   Are you in a relationship with someone who physically or mentally threatens you? N   Is it safe for you to go home? Y       Fall Risk  No flowsheet data found. Health Maintenance reviewed and discussed per provider. Yes    Demian Edge is due for hpv, pap, influenza, please order/place referral if appropriate. Advance Directive:  1. Do you have an advance directive in place? Patient Reply: no    2. If not, would you like material regarding how to put one in place? Patient Reply: no    Coordination of Care:  1. Have you been to the ER, urgent care clinic since your last visit? Hospitalized since your last visit? no    2. Have you seen or consulted any other health care providers outside of the [address] [place] [address] since your last visit?  Include any pap smears or colon screening.  no

## 2017-09-08 ENCOUNTER — HOSPITAL ENCOUNTER (OUTPATIENT)
Dept: CT IMAGING | Age: 27
Discharge: HOME | End: 2017-09-08
Attending: NURSE PRACTITIONER
Payer: COMMERCIAL

## 2017-09-08 DIAGNOSIS — R59.9 SWOLLEN LYMPH NODES: ICD-10-CM

## 2017-09-08 DIAGNOSIS — R22.1 LUMP IN NECK: ICD-10-CM

## 2017-09-08 PROCEDURE — 74011636320 HC RX REV CODE- 636/320: Performed by: NURSE PRACTITIONER

## 2017-09-08 PROCEDURE — 70491 CT SOFT TISSUE NECK W/DYE: CPT

## 2017-09-08 RX ADMIN — IOPAMIDOL 80 ML: 612 INJECTION, SOLUTION INTRAVENOUS at 12:47

## 2017-09-08 NOTE — PROGRESS NOTES
Call with CT results:  1. Normal sized lymph nodes  2. Normal thyroid gland  3.  No indication of malignancy or infection

## 2017-09-09 ENCOUNTER — TELEPHONE (OUTPATIENT)
Dept: INTERNAL MEDICINE CLINIC | Age: 27
End: 2017-09-09

## 2017-09-09 NOTE — TELEPHONE ENCOUNTER
----- Message from Irma Darden NP sent at 9/8/2017  3:52 PM EDT -----  Call with CT results:  1. Normal sized lymph nodes  2. Normal thyroid gland  3.  No indication of malignancy or infection

## 2017-09-09 NOTE — TELEPHONE ENCOUNTER
Contacted pt 2 pt identifiers confirmed informed pt of below she stated understanding no other questions or concerns noted at this time.

## 2018-01-15 ENCOUNTER — LAB ONLY (OUTPATIENT)
Dept: INTERNAL MEDICINE CLINIC | Age: 28
End: 2018-01-15

## 2018-01-15 DIAGNOSIS — Z11.1 SCREENING FOR TUBERCULOSIS: Primary | ICD-10-CM

## 2018-01-15 LAB
MM INDURATION POC: NORMAL MM (ref 0–5)
PPD POC: NORMAL NEGATIVE

## 2018-01-15 NOTE — PROGRESS NOTES
Patient presents to office today to have a PPD place, PPD placed on left forearm patient tolerated well.

## 2022-09-01 ENCOUNTER — OFFICE VISIT (OUTPATIENT)
Dept: ORTHOPEDIC SURGERY | Age: 32
End: 2022-09-01
Payer: COMMERCIAL

## 2022-09-01 VITALS
RESPIRATION RATE: 14 BRPM | WEIGHT: 151 LBS | OXYGEN SATURATION: 98 % | BODY MASS INDEX: 22.36 KG/M2 | HEIGHT: 69 IN | HEART RATE: 73 BPM

## 2022-09-01 DIAGNOSIS — M99.06 LOWER LIMB REGION SOMATIC DYSFUNCTION: ICD-10-CM

## 2022-09-01 DIAGNOSIS — M99.05 PELVIC SOMATIC DYSFUNCTION: ICD-10-CM

## 2022-09-01 DIAGNOSIS — M99.09 SOMATIC DYSFUNCTION OF ABDOMINAL REGION: ICD-10-CM

## 2022-09-01 DIAGNOSIS — M99.02 THORACIC REGION SOMATIC DYSFUNCTION: ICD-10-CM

## 2022-09-01 DIAGNOSIS — M99.04 SACRAL REGION SOMATIC DYSFUNCTION: ICD-10-CM

## 2022-09-01 DIAGNOSIS — M99.01 CERVICAL SOMATIC DYSFUNCTION: ICD-10-CM

## 2022-09-01 DIAGNOSIS — M99.07 UPPER EXTREMITY SOMATIC DYSFUNCTION: ICD-10-CM

## 2022-09-01 DIAGNOSIS — M43.16 SPONDYLOLISTHESIS, LUMBAR REGION: ICD-10-CM

## 2022-09-01 DIAGNOSIS — M99.08 RIB CAGE REGION SOMATIC DYSFUNCTION: ICD-10-CM

## 2022-09-01 DIAGNOSIS — G57.01 PIRIFORMIS SYNDROME, RIGHT: Primary | ICD-10-CM

## 2022-09-01 DIAGNOSIS — M99.03 LUMBAR REGION SOMATIC DYSFUNCTION: ICD-10-CM

## 2022-09-01 PROCEDURE — 99204 OFFICE O/P NEW MOD 45 MIN: CPT | Performed by: FAMILY MEDICINE

## 2022-09-01 PROCEDURE — 98929 OSTEOPATH MANJ 9-10 REGIONS: CPT | Performed by: FAMILY MEDICINE

## 2022-09-01 RX ORDER — IBUPROFEN 800 MG/1
TABLET ORAL
COMMUNITY
End: 2022-09-01 | Stop reason: SDUPTHER

## 2022-09-01 RX ORDER — IBUPROFEN 800 MG/1
800 TABLET ORAL
Qty: 90 TABLET | Refills: 2 | Status: SHIPPED | OUTPATIENT
Start: 2022-09-01

## 2022-09-01 NOTE — LETTER
9/1/2022    Patient: Gadiel Nino   YOB: 1990   Date of Visit: 9/1/2022     Magno Chaudhari, [address] [facility] [address]  Via Fax: [phone]    Dear Magno Chaudhari, KETURAH,      Thank you for referring Ms. Gadiel Nino to [place] [address]. for evaluation. My notes for this consultation are attached. If you have questions, please do not hesitate to call me. I look forward to following your patient along with you.       Sincerely,    Omid Banuelos, DO

## 2022-09-01 NOTE — PROGRESS NOTES
HISTORY OF PRESENT ILLNESS    Sofiardeborah Duran 1990 is a 29y.o. year old female comes in today as new patient for: right leg pain    Patients symptoms have been present for 2 weeks. Pain level 7/10 low back into right leg/shin. It has improved with ER visit 8/23/2022 and had XR done. Patient has tried:  ibuprofen 800 and back brace with benefit. It is described as pain after pulled back and then these Sx developed. .    IMAGING: XR lumbar 8/23/2022 images reviewed and I concur with:  No clearly acute findings. Trace L3-L4 retrolisthesis. Past Surgical History:   Procedure Laterality Date    OR EXPLORATORY OF ABDOMEN       Social History     Socioeconomic History    Marital status: SINGLE   Tobacco Use    Smoking status: Never    Smokeless tobacco: Never   Vaping Use    Vaping Use: Never used   Substance and Sexual Activity    Alcohol use: Never     Alcohol/week: 1.0 standard drink     Types: 1 Shots of liquor per week    Drug use: Never    Sexual activity: Yes     Partners: Male     Birth control/protection: Condom     Social Determinants of Health     Physical Activity: Sufficiently Active    Days of Exercise per Week: 5 days    Minutes of Exercise per Session: 60 min      Current Outpatient Medications   Medication Sig Dispense Refill    ibuprofen (MOTRIN) 800 mg tablet Take  by mouth.      multivitamin, tx-iron-ca-min (THERA-M W/ IRON) 9 mg iron-400 mcg tab tablet Take 1 Tab by mouth daily. adapalene (DIFFERIN) 0.3 % topical gel TIMBO A THIN LAYER AA OF THE FACE UTD. START Q OTHER NIGHT AND THEN  INCREASE TO Q NIGHT OVER TWO WKS  5    clindamycin (CLINDAGEL) 1 % topical gel TIMBO A THIN LAYER AA ON THE FACE ONCE TO BID (Patient not taking: Reported on 9/1/2022)  5    doxycycline (MONODOX) 100 mg capsule TK 1 C PO BID WF (Patient not taking: Reported on 9/1/2022)  3    ascorbic acid, vitamin C, (VITAMIN C) 250 mg tablet Take 1,000 mg by mouth daily.  (Patient not taking: Reported on 9/1/2022) Past Medical History:   Diagnosis Date    Acne     Calculus of kidney      Family History   Problem Relation Age of Onset    Heart Disease Mother     Hypertension Mother     Thyroid Disease Mother     Cancer Father     No Known Problems Sister     Thyroid Disease Maternal Grandmother     Thyroid Disease Maternal Aunt          ROS:  + numb right leg/foot, no incont, fever      Objective:  Pulse 73   Resp 14   Ht 5' 9\" (1.753 m)   Wt 151 lb (68.5 kg)   LMP 08/19/2022   SpO2 98%   BMI 22.30 kg/m²   NEURO:  Sensation decreased lateral right leg and medial right foot. Reflexes +2/4 patellar and Achilles bilaterally. M/S:  Examined seated and supine. Slump negative. Standing flexion test positive right  Sphinx test positive left. ASIS low right  Iliac crests equal bilaterally Pubes equal bilaterally Medial malleolus low right  Sacral base posterior left  YENY low right  TTA at C3 on left worse flexion, T2 on right worse flexion, and L2, 4, 5 on right worse flexion  Rib(s) 2 right TTP and posterior LE Strength +5/5 bilaterally Piriformis tight right Thoracic diaphragm restricted right. Scapula motion restricted w/ TTA left. Hip flexion limited left. Assessment/Plan:     ICD-10-CM ICD-9-CM    1. Piriformis syndrome, right  G57.01 355.0 ibuprofen (MOTRIN) 800 mg tablet      2. Spondylolisthesis, lumbar region  M43.16 738.4 ibuprofen (MOTRIN) 800 mg tablet      3. Lumbar region somatic dysfunction  M99.03 739.3 NE OSTEOPATHIC MANIP,9-10 BODY REGN      4. Pelvic somatic dysfunction  M99.05 739.5 NE OSTEOPATHIC MANIP,9-10 BODY REGN      5. Sacral region somatic dysfunction  M99.04 739.4 NE OSTEOPATHIC MANIP,9-10 BODY REGN      6. Thoracic region somatic dysfunction  M99.02 739.2 NE OSTEOPATHIC MANIP,9-10 BODY REGN      7. Rib cage region somatic dysfunction  M99.08 739.8 NE OSTEOPATHIC MANIP,9-10 BODY REGN      8. Cervical somatic dysfunction  M99.01 739.1 NE OSTEOPATHIC MANIP,9-10 BODY REGN      9.  Upper extremity somatic dysfunction  M99.07 739.7 [place] OSTEOPATHIC MANIP,9-10 BODY REGN      10. Lower limb region somatic dysfunction  M99.06 739.6 [place] OSTEOPATHIC MANIP,9-10 BODY REGN      11. Somatic dysfunction of abdominal region  M99.09 739.9 [address]          Patient (or guardian if minor) verbalizes understanding of evaluation and plan. Verbal consent obtained. Cervical, Thoracic, Rib, Lumbar, Pelvic, Sacral, Upper Ext, Lower Ext, and Abdominal  SD treated with myofascial and ME. Correction of previous malalignments verified after Tx. Pt tolerated well. Notes improvement of Sx and pain is now rated 0/10. HEP/stretches daily. Discussed stretching/strengthening/posture. Will start HEP and Rx for ibuprofen 800mg  as above and plan follow-up 3 weeks.

## 2022-09-22 ENCOUNTER — OFFICE VISIT (OUTPATIENT)
Dept: ORTHOPEDIC SURGERY | Age: 32
End: 2022-09-22
Payer: COMMERCIAL

## 2022-09-22 VITALS
HEART RATE: 81 BPM | WEIGHT: 152 LBS | OXYGEN SATURATION: 98 % | BODY MASS INDEX: 22.51 KG/M2 | HEIGHT: 69 IN | RESPIRATION RATE: 14 BRPM

## 2022-09-22 DIAGNOSIS — M99.03 LUMBAR REGION SOMATIC DYSFUNCTION: ICD-10-CM

## 2022-09-22 DIAGNOSIS — M99.09 SOMATIC DYSFUNCTION OF ABDOMINAL REGION: ICD-10-CM

## 2022-09-22 DIAGNOSIS — M99.06 LOWER LIMB REGION SOMATIC DYSFUNCTION: ICD-10-CM

## 2022-09-22 DIAGNOSIS — M99.02 THORACIC REGION SOMATIC DYSFUNCTION: ICD-10-CM

## 2022-09-22 DIAGNOSIS — M99.01 CERVICAL SOMATIC DYSFUNCTION: ICD-10-CM

## 2022-09-22 DIAGNOSIS — M99.04 SACRAL REGION SOMATIC DYSFUNCTION: ICD-10-CM

## 2022-09-22 DIAGNOSIS — M99.08 RIB CAGE REGION SOMATIC DYSFUNCTION: ICD-10-CM

## 2022-09-22 DIAGNOSIS — M99.07 UPPER EXTREMITY SOMATIC DYSFUNCTION: ICD-10-CM

## 2022-09-22 DIAGNOSIS — M43.16 SPONDYLOLISTHESIS, LUMBAR REGION: ICD-10-CM

## 2022-09-22 DIAGNOSIS — M99.05 PELVIC SOMATIC DYSFUNCTION: ICD-10-CM

## 2022-09-22 DIAGNOSIS — G57.01 PIRIFORMIS SYNDROME, RIGHT: Primary | ICD-10-CM

## 2022-09-22 PROCEDURE — 98929 OSTEOPATH MANJ 9-10 REGIONS: CPT | Performed by: FAMILY MEDICINE

## 2022-09-22 PROCEDURE — 99213 OFFICE O/P EST LOW 20 MIN: CPT | Performed by: FAMILY MEDICINE

## 2022-09-22 NOTE — PROGRESS NOTES
HISTORY OF PRESENT ILLNESS    Kaiser Schmitz 1990 is a 29y.o. year old female comes in today to be evaluated and treated for: right leg pain    Since last appt has noticed pain improved with piriformis HEP. Pain level 3/10. Using ibuprofen 800 PRN with benefit. IMAGING:  XR lumbar 8/23/2022 images reviewed and I concur with:  No clearly acute findings. Trace L3-L4 retrolisthesis. Past Surgical History:   Procedure Laterality Date    [place] EXPLORATORY OF ABDOMEN       Social History     Socioeconomic History    Marital status: SINGLE   Tobacco Use    Smoking status: Never    Smokeless tobacco: Never   Vaping Use    Vaping Use: Never used   Substance and Sexual Activity    Alcohol use: Never     Alcohol/week: 1.0 standard drink     Types: 1 Shots of liquor per week    Drug use: Never    Sexual activity: Yes     Partners: Male     Birth control/protection: Condom     Social Determinants of Health     Physical Activity: Sufficiently Active    Days of Exercise per Week: 5 days    Minutes of Exercise per Session: 60 min     Current Outpatient Medications   Medication Sig Dispense Refill    ibuprofen (MOTRIN) 800 mg tablet Take 1 Tablet by mouth every eight (8) hours as needed for Pain. 90 Tablet 2    adapalene (DIFFERIN) 0.3 % topical gel TIMBO A THIN LAYER AA OF THE FACE UTD. START Q OTHER NIGHT AND THEN  INCREASE TO Q NIGHT OVER TWO WKS  5    multivitamin, tx-iron-ca-min (THERA-M W/ IRON) 9 mg iron-400 mcg tab tablet Take 1 Tab by mouth daily.        Past Medical History:   Diagnosis Date    Acne     Calculus of kidney      Family History   Problem Relation Age of Onset    Heart Disease Mother     Hypertension Mother     Thyroid Disease Mother     Cancer Father     No Known Problems Sister     Thyroid Disease Maternal Grandmother     Thyroid Disease Maternal Aunt          ROS:  still numbness right leg/foot, no incont, fever    Objective:  Pulse 81   Resp 14   Ht 5' 9\" (1.753 m)   Wt 152 lb (68.9 kg) SpO2 98%   BMI 22.45 kg/m²   NEURO:  Sensation decreased lateral right leg/foot and medial right foot. Reflexes +2/4 patellar and Achilles bilaterally. M/S:  Examined seated and supine. Slump negative. Standing flexion test positive right  Sphinx test positive left. ASIS low right  Iliac crests equal bilaterally Pubes equal bilaterally Medial malleolus low right  Sacral base posterior left  YENY low right  TTA at C3 on left worse flexion, T1 on right worse flexion, and L4, 5 on right worse flexion  Rib(s) 1 right TTP and superior LE Strength +5/5 bilaterally Piriformis mild tight right Thoracic diaphragm restricted right. Scapula motion restricted w/ TTA left. Hip flexion limited left. Assessment/Plan:     ICD-10-CM ICD-9-CM    1. Piriformis syndrome, right  G57.01 355.0       2. Spondylolisthesis, lumbar region  M43.16 738.4 AR OSTEOPATHIC MANIP,9-10 BODY REGN      3. Lumbar region somatic dysfunction  M99.03 739.3 AR OSTEOPATHIC MANIP,9-10 BODY REGN      4. Pelvic somatic dysfunction  M99.05 739.5 AR OSTEOPATHIC MANIP,9-10 BODY REGN      5. Sacral region somatic dysfunction  M99.04 739.4 AR OSTEOPATHIC MANIP,9-10 BODY REGN      6. Thoracic region somatic dysfunction  M99.02 739.2 AR OSTEOPATHIC MANIP,9-10 BODY REGN      7. Rib cage region somatic dysfunction  M99.08 739.8 AR OSTEOPATHIC MANIP,9-10 BODY REGN      8. Cervical somatic dysfunction  M99.01 739.1 AR OSTEOPATHIC MANIP,9-10 BODY REGN      9. Upper extremity somatic dysfunction  M99.07 739.7 AR OSTEOPATHIC MANIP,9-10 BODY REGN      10. Lower limb region somatic dysfunction  M99.06 739.6 AR OSTEOPATHIC MANIP,9-10 BODY REGN      11. Somatic dysfunction of abdominal region  M99.09 739.9 [address]          Patient (or guardian if minor) verbalizes understanding of evaluation and plan. Verbal consent obtained.   Cervical, Thoracic, Rib, Lumbar, Pelvic, Sacral, Lower Ext, Upper Ext, and Abdominal SD treated with myofascial and ME. Correction of previous malalignments verified after Tx. Pt tolerated well. Notes improvement of Sx and pain is now rated 0/10. HEP/stretches daily. Discussed stretching/strengthening/posture. Will continue HEP and Rx for ibuprofen PRN from prior  as above and plan follow-up 8 weeks PRN.

## 2022-09-22 NOTE — LETTER
9/22/2022    Patient: Jenny Ayon   YOB: 1990   Date of Visit: 9/22/2022     Rogelio Prakash, [address] [address]  Via Fax: [phone]    Dear Rogelio Prakash [facility],      Thank you for referring Ms. Jenny Ayon to [facility]. for evaluation. My notes for this consultation are attached. If you have questions, please do not hesitate to call me. I look forward to following your patient along with you.       Sincerely,    Santana Hernandez, DO

## 2024-07-15 SDOH — HEALTH STABILITY: PHYSICAL HEALTH: ON AVERAGE, HOW MANY DAYS PER WEEK DO YOU ENGAGE IN MODERATE TO STRENUOUS EXERCISE (LIKE A BRISK WALK)?: 7 DAYS

## 2024-07-15 SDOH — HEALTH STABILITY: PHYSICAL HEALTH: ON AVERAGE, HOW MANY MINUTES DO YOU ENGAGE IN EXERCISE AT THIS LEVEL?: 60 MIN

## 2024-07-18 ENCOUNTER — OFFICE VISIT (OUTPATIENT)
Facility: CLINIC | Age: 34
End: 2024-07-18
Payer: COMMERCIAL

## 2024-07-18 VITALS
TEMPERATURE: 98 F | WEIGHT: 141 LBS | HEIGHT: 67 IN | HEART RATE: 56 BPM | DIASTOLIC BLOOD PRESSURE: 68 MMHG | RESPIRATION RATE: 16 BRPM | OXYGEN SATURATION: 98 % | BODY MASS INDEX: 22.13 KG/M2 | SYSTOLIC BLOOD PRESSURE: 106 MMHG

## 2024-07-18 DIAGNOSIS — Z13.89 SCREENING FOR BLOOD OR PROTEIN IN URINE: ICD-10-CM

## 2024-07-18 DIAGNOSIS — Z13.0 SCREENING FOR DEFICIENCY ANEMIA: ICD-10-CM

## 2024-07-18 DIAGNOSIS — Z13.29 SCREENING FOR THYROID DISORDER: ICD-10-CM

## 2024-07-18 DIAGNOSIS — Z13.220 SCREENING FOR CHOLESTEROL LEVEL: ICD-10-CM

## 2024-07-18 DIAGNOSIS — Z13.1 SCREENING FOR DIABETES MELLITUS (DM): ICD-10-CM

## 2024-07-18 DIAGNOSIS — Z76.89 ENCOUNTER TO ESTABLISH CARE: Primary | ICD-10-CM

## 2024-07-18 DIAGNOSIS — Z13.228 SCREENING FOR METABOLIC DISORDER: ICD-10-CM

## 2024-07-18 PROCEDURE — 99203 OFFICE O/P NEW LOW 30 MIN: CPT | Performed by: NURSE PRACTITIONER

## 2024-07-18 SDOH — ECONOMIC STABILITY: FOOD INSECURITY: WITHIN THE PAST 12 MONTHS, THE FOOD YOU BOUGHT JUST DIDN'T LAST AND YOU DIDN'T HAVE MONEY TO GET MORE.: NEVER TRUE

## 2024-07-18 SDOH — ECONOMIC STABILITY: HOUSING INSECURITY
IN THE LAST 12 MONTHS, WAS THERE A TIME WHEN YOU DID NOT HAVE A STEADY PLACE TO SLEEP OR SLEPT IN A SHELTER (INCLUDING NOW)?: NO

## 2024-07-18 SDOH — ECONOMIC STABILITY: FOOD INSECURITY: WITHIN THE PAST 12 MONTHS, YOU WORRIED THAT YOUR FOOD WOULD RUN OUT BEFORE YOU GOT MONEY TO BUY MORE.: NEVER TRUE

## 2024-07-18 SDOH — ECONOMIC STABILITY: INCOME INSECURITY: HOW HARD IS IT FOR YOU TO PAY FOR THE VERY BASICS LIKE FOOD, HOUSING, MEDICAL CARE, AND HEATING?: NOT VERY HARD

## 2024-07-18 ASSESSMENT — PATIENT HEALTH QUESTIONNAIRE - PHQ9
8. MOVING OR SPEAKING SO SLOWLY THAT OTHER PEOPLE COULD HAVE NOTICED. OR THE OPPOSITE, BEING SO FIGETY OR RESTLESS THAT YOU HAVE BEEN MOVING AROUND A LOT MORE THAN USUAL: NOT AT ALL
9. THOUGHTS THAT YOU WOULD BE BETTER OFF DEAD, OR OF HURTING YOURSELF: NOT AT ALL
5. POOR APPETITE OR OVEREATING: SEVERAL DAYS
10. IF YOU CHECKED OFF ANY PROBLEMS, HOW DIFFICULT HAVE THESE PROBLEMS MADE IT FOR YOU TO DO YOUR WORK, TAKE CARE OF THINGS AT HOME, OR GET ALONG WITH OTHER PEOPLE: NOT DIFFICULT AT ALL
6. FEELING BAD ABOUT YOURSELF - OR THAT YOU ARE A FAILURE OR HAVE LET YOURSELF OR YOUR FAMILY DOWN: MORE THAN HALF THE DAYS
2. FEELING DOWN, DEPRESSED OR HOPELESS: NEARLY EVERY DAY
SUM OF ALL RESPONSES TO PHQ QUESTIONS 1-9: 10
3. TROUBLE FALLING OR STAYING ASLEEP: SEVERAL DAYS
SUM OF ALL RESPONSES TO PHQ QUESTIONS 1-9: 10
SUM OF ALL RESPONSES TO PHQ QUESTIONS 1-9: 10
7. TROUBLE CONCENTRATING ON THINGS, SUCH AS READING THE NEWSPAPER OR WATCHING TELEVISION: MORE THAN HALF THE DAYS
1. LITTLE INTEREST OR PLEASURE IN DOING THINGS: NOT AT ALL
SUM OF ALL RESPONSES TO PHQ QUESTIONS 1-9: 10
SUM OF ALL RESPONSES TO PHQ9 QUESTIONS 1 & 2: 3
4. FEELING TIRED OR HAVING LITTLE ENERGY: SEVERAL DAYS

## 2024-07-18 ASSESSMENT — ANXIETY QUESTIONNAIRES
GAD7 TOTAL SCORE: 18
2. NOT BEING ABLE TO STOP OR CONTROL WORRYING: NEARLY EVERY DAY
7. FEELING AFRAID AS IF SOMETHING AWFUL MIGHT HAPPEN: NOT AT ALL
6. BECOMING EASILY ANNOYED OR IRRITABLE: NEARLY EVERY DAY
5. BEING SO RESTLESS THAT IT IS HARD TO SIT STILL: NEARLY EVERY DAY
3. WORRYING TOO MUCH ABOUT DIFFERENT THINGS: NEARLY EVERY DAY
4. TROUBLE RELAXING: NEARLY EVERY DAY
1. FEELING NERVOUS, ANXIOUS, OR ON EDGE: NEARLY EVERY DAY

## 2024-07-18 NOTE — PROGRESS NOTES
Sommer Phipps is a 34 y.o. female is seen on 7/18/2024 for New Patient    Assessment & Plan:     1. Encounter to establish care  2. Screening for diabetes mellitus (DM)  -     Hemoglobin A1C; Future  3. Screening for blood or protein in urine  -     Urinalysis with Microscopic; Future  4. Screening for metabolic disorder  -     Comprehensive Metabolic Panel; Future  5. Screening for cholesterol level  -     Lipid Panel; Future  6. Screening for thyroid disorder  -     TSH + Free T4 Panel; Future  7. Screening for deficiency anemia  -     CBC with Auto Differential; Future    Subjective:     HPI  Est care. Pt is from [place], [place]. Pt use to work in mental health.     Pt has H/O anxiety and depression. Pt declined medication. She is getting counseling.     No complaints     Social Hx:  Occupation- [employer]   Household- pt,   ETOH use- no  Recreational drug use- marijuana  Tobacco use- no    Gyn Hx:  Last PAP: last month, normal  G0  Date of LMP: 6/10/2024  Hx of STDs:  Birth Control: no    Family Hx:  Family History   Problem Relation Age of Onset    Thyroid Disease Mother     Heart Disease Mother     Hypertension Mother     No Known Problems Sister     Thyroid Disease Maternal Grandmother     Mult Sclerosis Paternal Grandmother     Colon Cancer Paternal Grandfather     Thyroid Disease Maternal Aunt       Preventive:  COVID-19 vac - yes  Flu vaccine- no  Pneumonia vaccine-  Tetanus vaccine- ??  MyChart activation - yes    Review of Systems   General: negative for - chills, fever, weight changes or malaise  HEENT: no sore throat, nasal congestion, vision problems or ear problems  Respiratory: no cough, shortness of breath, or wheezing  Cardiovascular: no chest pain, palpitations, or dyspnea on exertion  Gastrointestinal: no abdominal pain, N/V, change in bowel habits, or black or bloody stools  Musculoskeletal: no back pain, joint pain, joint stiffness, muscle pain or muscle weakness  Neurological: no

## 2024-07-18 NOTE — PROGRESS NOTES
Sommer Phipps is a 34 y.o. year old female who presents today for   Chief Complaint   Patient presents with    New Patient       Is someone accompanying this pt? no    Is the patient using any DME equipment during OV? no    Depression Screenin/18/2024    11:56 AM   PHQ-9 Questionaire   Little interest or pleasure in doing things 0   Feeling down, depressed, or hopeless 3   Trouble falling or staying asleep, or sleeping too much 1   Feeling tired or having little energy 1   Poor appetite or overeating 1   Feeling bad about yourself - or that you are a failure or have let yourself or your family down 2   Trouble concentrating on things, such as reading the newspaper or watching television 2   Moving or speaking so slowly that other people could have noticed. Or the opposite - being so fidgety or restless that you have been moving around a lot more than usual 0   Thoughts that you would be better off dead, or of hurting yourself in some way 0   PHQ-9 Total Score 10   If you checked off any problems, how difficult have these problems made it for you to do your work, take care of things at home, or get along with other people? 0       Abuse Screening:       No data to display                Learning Assessment:  No question data found.    Fall Risk:       No data to display                    Coordination of Care:   1. \"Have you been to the ER, urgent care clinic since your last visit?  Hospitalized since your last visit?\" no    2. \"Have you seen or consulted any other health care providers outside of the [facility] since your last visit?\" no    3. For patients aged 45-75: Has the patient had a colonoscopy / FIT/ Cologuard? N/A    If the patient is female:    4. For patients aged 40-74: Has the patient had a mammogram within the past 2 years? N/A    5. For patients aged 21-65: Has the patient had a pap smear? YES, LAST MONTH AT [facility]    Health Maintenance: reviewed and discussed

## 2024-07-19 ENCOUNTER — HOSPITAL ENCOUNTER (OUTPATIENT)
Facility: HOSPITAL | Age: 34
Setting detail: SPECIMEN
End: 2024-07-19
Payer: COMMERCIAL

## 2024-07-19 DIAGNOSIS — Z13.220 SCREENING FOR CHOLESTEROL LEVEL: ICD-10-CM

## 2024-07-19 DIAGNOSIS — Z13.29 SCREENING FOR THYROID DISORDER: ICD-10-CM

## 2024-07-19 DIAGNOSIS — Z13.89 SCREENING FOR BLOOD OR PROTEIN IN URINE: ICD-10-CM

## 2024-07-19 DIAGNOSIS — Z13.1 SCREENING FOR DIABETES MELLITUS (DM): ICD-10-CM

## 2024-07-19 DIAGNOSIS — Z13.0 SCREENING FOR DEFICIENCY ANEMIA: ICD-10-CM

## 2024-07-19 DIAGNOSIS — Z13.228 SCREENING FOR METABOLIC DISORDER: ICD-10-CM

## 2024-07-19 LAB
ALBUMIN SERPL-MCNC: 4.1 G/DL (ref 3.4–5)
ALBUMIN/GLOB SERPL: 1.1 (ref 0.8–1.7)
ALP SERPL-CCNC: 71 U/L (ref 45–117)
ALT SERPL-CCNC: 13 U/L (ref 13–56)
ANION GAP SERPL CALC-SCNC: 1 MMOL/L (ref 3–18)
APPEARANCE UR: CLEAR
AST SERPL-CCNC: 15 U/L (ref 10–38)
BACTERIA URNS QL MICRO: NEGATIVE /HPF
BASOPHILS # BLD: 0.1 K/UL (ref 0–0.1)
BASOPHILS NFR BLD: 1 % (ref 0–2)
BILIRUB SERPL-MCNC: 0.5 MG/DL (ref 0.2–1)
BILIRUB UR QL: NEGATIVE
BUN SERPL-MCNC: 14 MG/DL (ref 7–18)
BUN/CREAT SERPL: 21 (ref 12–20)
CALCIUM SERPL-MCNC: 9.8 MG/DL (ref 8.5–10.1)
CHLORIDE SERPL-SCNC: 105 MMOL/L (ref 100–111)
CHOLEST SERPL-MCNC: 162 MG/DL
CO2 SERPL-SCNC: 29 MMOL/L (ref 21–32)
COLOR UR: YELLOW
CREAT SERPL-MCNC: 0.67 MG/DL (ref 0.6–1.3)
DIFFERENTIAL METHOD BLD: ABNORMAL
EOSINOPHIL # BLD: 0.4 K/UL (ref 0–0.4)
EOSINOPHIL NFR BLD: 6 % (ref 0–5)
EPITH CASTS URNS QL MICRO: ABNORMAL /LPF (ref 0–5)
ERYTHROCYTE [DISTWIDTH] IN BLOOD BY AUTOMATED COUNT: 12.7 % (ref 11.6–14.5)
EST. AVERAGE GLUCOSE BLD GHB EST-MCNC: 94 MG/DL
GLOBULIN SER CALC-MCNC: 3.6 G/DL (ref 2–4)
GLUCOSE SERPL-MCNC: 93 MG/DL (ref 74–99)
GLUCOSE UR STRIP.AUTO-MCNC: NEGATIVE MG/DL
HBA1C MFR BLD: 4.9 % (ref 4.2–5.6)
HCT VFR BLD AUTO: 42.2 % (ref 35–45)
HDLC SERPL-MCNC: 59 MG/DL (ref 40–60)
HDLC SERPL: 2.7 (ref 0–5)
HGB BLD-MCNC: 13.2 G/DL (ref 12–16)
HGB UR QL STRIP: NEGATIVE
IMM GRANULOCYTES # BLD AUTO: 0 K/UL (ref 0–0.04)
IMM GRANULOCYTES NFR BLD AUTO: 0 % (ref 0–0.5)
KETONES UR QL STRIP.AUTO: NEGATIVE MG/DL
LDLC SERPL CALC-MCNC: 92 MG/DL (ref 0–100)
LEUKOCYTE ESTERASE UR QL STRIP.AUTO: ABNORMAL
LIPID PANEL: NORMAL
LYMPHOCYTES # BLD: 1.8 K/UL (ref 0.9–3.6)
LYMPHOCYTES NFR BLD: 27 % (ref 21–52)
MCH RBC QN AUTO: 28.3 PG (ref 24–34)
MCHC RBC AUTO-ENTMCNC: 31.3 G/DL (ref 31–37)
MCV RBC AUTO: 90.4 FL (ref 78–100)
MONOCYTES # BLD: 0.6 K/UL (ref 0.05–1.2)
MONOCYTES NFR BLD: 8 % (ref 3–10)
NEUTS SEG # BLD: 4 K/UL (ref 1.8–8)
NEUTS SEG NFR BLD: 59 % (ref 40–73)
NITRITE UR QL STRIP.AUTO: NEGATIVE
NRBC # BLD: 0 K/UL (ref 0–0.01)
NRBC BLD-RTO: 0 PER 100 WBC
PH UR STRIP: 6.5 (ref 5–8)
PLATELET # BLD AUTO: 275 K/UL (ref 135–420)
PMV BLD AUTO: 10.6 FL (ref 9.2–11.8)
POTASSIUM SERPL-SCNC: 4.7 MMOL/L (ref 3.5–5.5)
PROT SERPL-MCNC: 7.7 G/DL (ref 6.4–8.2)
PROT UR STRIP-MCNC: NEGATIVE MG/DL
RBC # BLD AUTO: 4.67 M/UL (ref 4.2–5.3)
RBC #/AREA URNS HPF: NEGATIVE /HPF (ref 0–5)
SODIUM SERPL-SCNC: 135 MMOL/L (ref 136–145)
SP GR UR REFRACTOMETRY: <1.005 (ref 1–1.03)
T4 FREE SERPL-MCNC: 1.1 NG/DL (ref 0.7–1.5)
TRIGL SERPL-MCNC: 55 MG/DL
TSH SERPL DL<=0.05 MIU/L-ACNC: 1.55 UIU/ML (ref 0.36–3.74)
UROBILINOGEN UR QL STRIP.AUTO: 0.2 EU/DL (ref 0.2–1)
VLDLC SERPL CALC-MCNC: 11 MG/DL
WBC # BLD AUTO: 6.9 K/UL (ref 4.6–13.2)
WBC URNS QL MICRO: ABNORMAL /HPF (ref 0–4)

## 2024-07-19 PROCEDURE — 83036 HEMOGLOBIN GLYCOSYLATED A1C: CPT

## 2024-07-19 PROCEDURE — 36415 COLL VENOUS BLD VENIPUNCTURE: CPT

## 2024-07-19 PROCEDURE — 84443 ASSAY THYROID STIM HORMONE: CPT

## 2024-07-19 PROCEDURE — 85025 COMPLETE CBC W/AUTO DIFF WBC: CPT

## 2024-07-19 PROCEDURE — 84439 ASSAY OF FREE THYROXINE: CPT

## 2024-07-19 PROCEDURE — 80061 LIPID PANEL: CPT

## 2024-07-19 PROCEDURE — 80053 COMPREHEN METABOLIC PANEL: CPT

## 2024-07-19 PROCEDURE — 81001 URINALYSIS AUTO W/SCOPE: CPT

## 2024-08-08 ENCOUNTER — TELEPHONE (OUTPATIENT)
Facility: CLINIC | Age: 34
End: 2024-08-08

## 2024-08-08 NOTE — TELEPHONE ENCOUNTER
Pt called stating she received abnormal lab results and left a previous message for provider to call to discuss. She never received a call.    Pt would like TIAN Lindquist to call her today b/c she is very concerned.    She will not be available between 1:00pm and 2:30pm.    Please call. Thank you.

## 2024-08-22 ENCOUNTER — OFFICE VISIT (OUTPATIENT)
Facility: CLINIC | Age: 34
End: 2024-08-22
Payer: COMMERCIAL

## 2024-08-22 VITALS
OXYGEN SATURATION: 97 % | DIASTOLIC BLOOD PRESSURE: 65 MMHG | HEIGHT: 69 IN | TEMPERATURE: 97.7 F | HEART RATE: 61 BPM | BODY MASS INDEX: 21.45 KG/M2 | WEIGHT: 144.8 LBS | SYSTOLIC BLOOD PRESSURE: 103 MMHG | RESPIRATION RATE: 20 BRPM

## 2024-08-22 DIAGNOSIS — Z71.89 ACP (ADVANCE CARE PLANNING): ICD-10-CM

## 2024-08-22 DIAGNOSIS — M41.9 MILD SCOLIOSIS: ICD-10-CM

## 2024-08-22 DIAGNOSIS — Z00.00 ANNUAL PHYSICAL EXAM: Primary | ICD-10-CM

## 2024-08-22 PROCEDURE — 99395 PREV VISIT EST AGE 18-39: CPT | Performed by: NURSE PRACTITIONER

## 2024-08-22 ASSESSMENT — PATIENT HEALTH QUESTIONNAIRE - PHQ9
SUM OF ALL RESPONSES TO PHQ9 QUESTIONS 1 & 2: 0
2. FEELING DOWN, DEPRESSED OR HOPELESS: NOT AT ALL
1. LITTLE INTEREST OR PLEASURE IN DOING THINGS: NOT AT ALL
SUM OF ALL RESPONSES TO PHQ QUESTIONS 1-9: 0

## 2024-08-22 NOTE — PROGRESS NOTES
Sommer Phipps is a 34 y.o. female  established patient, here for evaluation of the following chief complaint(s):  Chief Complaint   Patient presents with    Discuss Labs      Assessment and Plan  1. Annual physical exam  2. ACP (advance care planning)  -     FULL CODE  3. Mild scoliosis     Return in about 1 month (around 9/22/2024).   HPI:   In office visit for annual exam. Pt is well.     Pt has corrective lens and a current formal eye exam    LMP 8/1/2024. No birth control.     Mild scoliosis and H/O left sided sciatica    ROS:    General: negative for - chills, fever, weight changes or malaise  HEENT: no sore throat, nasal congestion, vision problems or ear problems  Respiratory: no cough, shortness of breath, or wheezing  Cardiovascular: no chest pain, palpitations, or dyspnea on exertion  Gastrointestinal: no abdominal pain, N/V, change in bowel habits  Musculoskeletal: no back pain or joint pain  Neurological: no headache or dizziness  Endo:  No polyuria or polydipsia  : no urinary  Skin: none   Psychological: negative for - anxiety, depression, sleeps issues    Family History   Problem Relation Age of Onset    Thyroid Disease Mother     Heart Disease Mother     Hypertension Mother     Asthma Mother     Skin Cancer Father     Heart Disease Sister     Thyroid Disease Maternal Grandmother     Mult Sclerosis Paternal Grandmother     Colon Cancer Paternal Grandfather     Dementia Paternal Grandfather     Thyroid Disease Maternal Aunt         Prior to Admission medications    Not on File        No results found for this visit on 08/22/24.     Physical Exam  Patient appears well, she is pleasant, alert, oriented x 3, in no distress. underweight  ENT normal.  Neck supple. No adenopathy or thyromegaly. ERICA.   Lungs are clear, good air entry, no wheezes  Cardiovascular, S1 and S2 normal, no murmurs, regular rate and rhythm.   Abdomen is soft without tenderness, guarding  /Anorectal,

## 2024-08-22 NOTE — ACP (ADVANCE CARE PLANNING)
Advance Care Planning   General Advance Care Planning (ACP) Conversation    Date of Conversation: 8/22/2024  Conducted with: Patient with Decision Making Capacity  Other persons present: None    Healthcare Decision Maker:    Primary Decision Maker: Jorden Fernandez - Spouse - [phone]    Today we documented Decision Maker(s) consistent with Legal Next of Kin hierarchy.  Content/Action Overview:  Has ACP document(s) NOT on file - requested patient to provide  Reviewed DNR/DNI and patient elects Full Code (Attempt Resuscitation)  treatment goals, artificial nutrition, ventilation preferences, and resuscitation preferences      Length of Voluntary ACP Conversation in minutes:  <16 minutes (Non-Billable)    MANISHA GARCIA, APRN - CNP

## 2024-08-22 NOTE — PROGRESS NOTES
Sommer Phipps is a 34 y.o. presents today for   Chief Complaint   Patient presents with    Discuss Labs       Is someone accompanying this pt? NO    Is the patient using any DME equipment during OV? NO    Depression Screenin/22/2024    11:45 AM 2024    11:56 AM   PHQ-9 Questionaire   Little interest or pleasure in doing things 0 0   Feeling down, depressed, or hopeless 0 3   Trouble falling or staying asleep, or sleeping too much  1   Feeling tired or having little energy  1   Poor appetite or overeating  1   Feeling bad about yourself - or that you are a failure or have let yourself or your family down  2   Trouble concentrating on things, such as reading the newspaper or watching television  2   Moving or speaking so slowly that other people could have noticed. Or the opposite - being so fidgety or restless that you have been moving around a lot more than usual  0   Thoughts that you would be better off dead, or of hurting yourself in some way  0   PHQ-9 Total Score 0 10   If you checked off any problems, how difficult have these problems made it for you to do your work, take care of things at home, or get along with other people?  0       Abuse Screening:       No data to display                Learning Assessment:  No question data found.    Fall Risk:       No data to display                    Coordination of Care:   1. \"Have you been to the ER, urgent care clinic since your last visit?  Hospitalized since your last visit?\" NO    2. \"Have you seen or consulted any other health care providers outside of the [facility] since your last visit?\" NO    3. For patients aged 45-75: Has the patient had a colonoscopy / FIT/ Cologuard? NO    If the patient is female:    4. For patients aged 40-74: Has the patient had a mammogram within the past 2 years? NO    5. For patients aged 21-65: Has the patient had a pap smear? YES    Health Maintenance: reviewed and discussed and ordered per

## 2025-08-11 ENCOUNTER — OFFICE VISIT (OUTPATIENT)
Facility: CLINIC | Age: 35
End: 2025-08-11
Payer: COMMERCIAL

## 2025-08-11 VITALS
BODY MASS INDEX: 24.8 KG/M2 | HEART RATE: 75 BPM | WEIGHT: 158 LBS | SYSTOLIC BLOOD PRESSURE: 110 MMHG | DIASTOLIC BLOOD PRESSURE: 71 MMHG | HEIGHT: 67 IN | RESPIRATION RATE: 16 BRPM | TEMPERATURE: 97.4 F | OXYGEN SATURATION: 97 %

## 2025-08-11 DIAGNOSIS — M41.9 MILD SCOLIOSIS: Primary | ICD-10-CM

## 2025-08-11 DIAGNOSIS — M54.41 CHRONIC LEFT-SIDED LOW BACK PAIN WITH RIGHT-SIDED SCIATICA: ICD-10-CM

## 2025-08-11 DIAGNOSIS — M62.830 BACK SPASM: ICD-10-CM

## 2025-08-11 DIAGNOSIS — G89.29 CHRONIC LEFT-SIDED LOW BACK PAIN WITH RIGHT-SIDED SCIATICA: ICD-10-CM

## 2025-08-11 PROCEDURE — 99214 OFFICE O/P EST MOD 30 MIN: CPT | Performed by: NURSE PRACTITIONER

## 2025-08-11 RX ORDER — CYCLOBENZAPRINE HCL 10 MG
10 TABLET ORAL NIGHTLY PRN
Qty: 30 TABLET | Refills: 3 | Status: SHIPPED | OUTPATIENT
Start: 2025-08-11 | End: 2025-12-09

## 2025-08-11 SDOH — ECONOMIC STABILITY: FOOD INSECURITY: WITHIN THE PAST 12 MONTHS, THE FOOD YOU BOUGHT JUST DIDN'T LAST AND YOU DIDN'T HAVE MONEY TO GET MORE.: NEVER TRUE

## 2025-08-11 SDOH — ECONOMIC STABILITY: FOOD INSECURITY: WITHIN THE PAST 12 MONTHS, YOU WORRIED THAT YOUR FOOD WOULD RUN OUT BEFORE YOU GOT MONEY TO BUY MORE.: NEVER TRUE

## 2025-08-11 ASSESSMENT — PATIENT HEALTH QUESTIONNAIRE - PHQ9
1. LITTLE INTEREST OR PLEASURE IN DOING THINGS: NOT AT ALL
2. FEELING DOWN, DEPRESSED OR HOPELESS: NOT AT ALL
SUM OF ALL RESPONSES TO PHQ QUESTIONS 1-9: 0